# Patient Record
Sex: FEMALE | Race: WHITE | URBAN - METROPOLITAN AREA
[De-identification: names, ages, dates, MRNs, and addresses within clinical notes are randomized per-mention and may not be internally consistent; named-entity substitution may affect disease eponyms.]

---

## 2024-02-19 ENCOUNTER — LAB (OUTPATIENT)
Dept: LAB | Facility: LAB | Age: 29
End: 2024-02-19
Payer: COMMERCIAL

## 2024-02-19 ENCOUNTER — OFFICE VISIT (OUTPATIENT)
Dept: PRIMARY CARE | Facility: CLINIC | Age: 29
End: 2024-02-19
Payer: COMMERCIAL

## 2024-02-19 VITALS
BODY MASS INDEX: 24.27 KG/M2 | WEIGHT: 151 LBS | HEART RATE: 66 BPM | OXYGEN SATURATION: 98 % | DIASTOLIC BLOOD PRESSURE: 83 MMHG | SYSTOLIC BLOOD PRESSURE: 122 MMHG | HEIGHT: 66 IN | RESPIRATION RATE: 20 BRPM | TEMPERATURE: 97.9 F

## 2024-02-19 DIAGNOSIS — Z00.00 HEALTHCARE MAINTENANCE: ICD-10-CM

## 2024-02-19 DIAGNOSIS — M54.2 NECK PAIN: Primary | ICD-10-CM

## 2024-02-19 LAB
25(OH)D3 SERPL-MCNC: 29 NG/ML (ref 30–100)
ALBUMIN SERPL BCP-MCNC: 4.7 G/DL (ref 3.4–5)
ALP SERPL-CCNC: 55 U/L (ref 33–110)
ALT SERPL W P-5'-P-CCNC: 13 U/L (ref 7–45)
ANION GAP SERPL CALC-SCNC: 14 MMOL/L (ref 10–20)
AST SERPL W P-5'-P-CCNC: 16 U/L (ref 9–39)
BASOPHILS # BLD AUTO: 0.03 X10*3/UL (ref 0–0.1)
BASOPHILS NFR BLD AUTO: 0.4 %
BILIRUB SERPL-MCNC: 1 MG/DL (ref 0–1.2)
BUN SERPL-MCNC: 12 MG/DL (ref 6–23)
CALCIUM SERPL-MCNC: 9.8 MG/DL (ref 8.6–10.3)
CHLORIDE SERPL-SCNC: 102 MMOL/L (ref 98–107)
CHOLEST SERPL-MCNC: 177 MG/DL (ref 0–199)
CHOLESTEROL/HDL RATIO: 2.9
CO2 SERPL-SCNC: 27 MMOL/L (ref 21–32)
CREAT SERPL-MCNC: 0.79 MG/DL (ref 0.5–1.05)
EGFRCR SERPLBLD CKD-EPI 2021: >90 ML/MIN/1.73M*2
EOSINOPHIL # BLD AUTO: 0.09 X10*3/UL (ref 0–0.7)
EOSINOPHIL NFR BLD AUTO: 1.3 %
ERYTHROCYTE [DISTWIDTH] IN BLOOD BY AUTOMATED COUNT: 11.8 % (ref 11.5–14.5)
GLUCOSE SERPL-MCNC: 82 MG/DL (ref 74–99)
HCT VFR BLD AUTO: 43.4 % (ref 36–46)
HCV AB SER QL: NONREACTIVE
HDLC SERPL-MCNC: 60.3 MG/DL
HGB BLD-MCNC: 15.1 G/DL (ref 12–16)
HIV 1+2 AB+HIV1 P24 AG SERPL QL IA: NONREACTIVE
IMM GRANULOCYTES # BLD AUTO: 0.01 X10*3/UL (ref 0–0.7)
IMM GRANULOCYTES NFR BLD AUTO: 0.1 % (ref 0–0.9)
LDLC SERPL CALC-MCNC: 102 MG/DL
LYMPHOCYTES # BLD AUTO: 3.02 X10*3/UL (ref 1.2–4.8)
LYMPHOCYTES NFR BLD AUTO: 45.3 %
MCH RBC QN AUTO: 30.5 PG (ref 26–34)
MCHC RBC AUTO-ENTMCNC: 34.8 G/DL (ref 32–36)
MCV RBC AUTO: 88 FL (ref 80–100)
MONOCYTES # BLD AUTO: 0.49 X10*3/UL (ref 0.1–1)
MONOCYTES NFR BLD AUTO: 7.3 %
NEUTROPHILS # BLD AUTO: 3.03 X10*3/UL (ref 1.2–7.7)
NEUTROPHILS NFR BLD AUTO: 45.6 %
NON HDL CHOLESTEROL: 117 MG/DL (ref 0–149)
NRBC BLD-RTO: 0 /100 WBCS (ref 0–0)
PLATELET # BLD AUTO: 260 X10*3/UL (ref 150–450)
POTASSIUM SERPL-SCNC: 4.8 MMOL/L (ref 3.5–5.3)
PROT SERPL-MCNC: 7.4 G/DL (ref 6.4–8.2)
RBC # BLD AUTO: 4.95 X10*6/UL (ref 4–5.2)
SODIUM SERPL-SCNC: 138 MMOL/L (ref 136–145)
TRIGL SERPL-MCNC: 74 MG/DL (ref 0–149)
TSH SERPL-ACNC: 1.26 MIU/L (ref 0.44–3.98)
VLDL: 15 MG/DL (ref 0–40)
WBC # BLD AUTO: 6.7 X10*3/UL (ref 4.4–11.3)

## 2024-02-19 PROCEDURE — 1036F TOBACCO NON-USER: CPT

## 2024-02-19 PROCEDURE — 85025 COMPLETE CBC W/AUTO DIFF WBC: CPT

## 2024-02-19 PROCEDURE — 80053 COMPREHEN METABOLIC PANEL: CPT

## 2024-02-19 PROCEDURE — 87389 HIV-1 AG W/HIV-1&-2 AB AG IA: CPT

## 2024-02-19 PROCEDURE — 82306 VITAMIN D 25 HYDROXY: CPT

## 2024-02-19 PROCEDURE — 36415 COLL VENOUS BLD VENIPUNCTURE: CPT

## 2024-02-19 PROCEDURE — 80061 LIPID PANEL: CPT

## 2024-02-19 PROCEDURE — 84443 ASSAY THYROID STIM HORMONE: CPT

## 2024-02-19 PROCEDURE — 86803 HEPATITIS C AB TEST: CPT

## 2024-02-19 PROCEDURE — 99203 OFFICE O/P NEW LOW 30 MIN: CPT

## 2024-02-19 NOTE — ASSESSMENT & PLAN NOTE
Patient's neck and throat are normal on exam with no lymphadenopathy.  She may have had some post viral cervical lymphadenopathy following her illness in January.  The discomfort is already getting better.  We will obtain full annual blood work including a CBC with differential and TSH with reflex to free T4 if abnormal.  If there are any abnormalities on blood work that could be because of her neck pain or if it is not getting better over the course the next few weeks, then we will obtain an ultrasound of the neck.  Follow-up as needed.

## 2024-02-19 NOTE — PROGRESS NOTES
I reviewed the resident/fellow's documentation and discussed the patient with the resident. I agree with the resident medical decision making as documented in the note.   Patient's physical exam was normal per resident.  No night sweats, no bruising, no no fatigue, will get blood work.  If her symptoms which have been improving are still present in 2 weeks we will get an ultrasound of her neck possible CT scan.  Patient has no trouble swallowing talking.  If she starts having any, any increase symptoms any swelling any fever chills any trouble with her neck breathing go to the ER  Follow-up in 2 to 4 weeks  Agree with assessment and plan  Albino Whittington, DO

## 2024-02-19 NOTE — PROGRESS NOTES
"Subjective   Nan Meyer is a 29 y.o. female who presents for Neck Pain (Neck pain/ swollen areas to neck since January.).  For the last few weeks the patient has felt some mild discomfort on the right anterior side of her neck.  There was no injuries.  She was sick in January and had COVID positive sick contacts so she believes it may have been COVID.  She is no longer feeling sick and is not having any fevers, chills, sore throat, ear pain.  She thought that she might have felt a slightly swollen area on her anterior right neck but she cannot find it today.  Of note, patient's mother has AML so patient is concerned about the possibility of this in herself.    Objective   /83 (BP Location: Right arm, Patient Position: Sitting)   Pulse 66   Temp 36.6 °C (97.9 °F)   Resp 20   Ht 1.676 m (5' 6\")   Wt 68.5 kg (151 lb)   SpO2 98%   BMI 24.37 kg/m²    PHYSICAL EXAM  Gen: Well appearing, in NAD  Eyes: EOMI  HEENT: MMM. TMs normal. Throat normal.  There is no lymphadenopathy felt or seen in the neck.  There is no tenderness to palpation of the neck or thyroid.  Thyroid feels normal.  Heart: RRR, no murmurs  Lungs: No increased work of breathing, CTAB, on RA  Extremities: WWP, cap refill <2sec, no pitting edema in LE b/l  Neuro: Alert, symmetrical facies, moves all extremities equally  Psych: Appropriate mood and affect    Assessment/Plan     Problem List Items Addressed This Visit       Neck pain - Primary     Patient's neck and throat are normal on exam with no lymphadenopathy.  She may have had some post viral cervical lymphadenopathy following her illness in January.  The discomfort is already getting better.  We will obtain full annual blood work including a CBC with differential and TSH with reflex to free T4 if abnormal.  If there are any abnormalities on blood work that could be because of her neck pain or if it is not getting better over the course the next few weeks, then we will obtain an ultrasound " of the neck.  Follow-up as needed.         Healthcare maintenance    Relevant Orders    Comprehensive Metabolic Panel    Lipid Panel    TSH with reflex to Free T4 if abnormal    Vitamin D 25-Hydroxy,Total (for eval of Vitamin D levels)    CBC and Auto Differential    Hepatitis C Antibody    HIV 1/2 Antigen/Antibody Screen with Reflex to Confirmation      Etta Olea DO  Family Medicine Resident, PGY-3  [facility]  [address] [place] [address] [phone]

## 2024-02-20 NOTE — RESULT ENCOUNTER NOTE
LDL cholesterol slightly elevated at 102.  Vitamin D level is just slightly low at 29.  She can start taking over-the-counter vitamin D 2000 international units daily.  Remainder of blood work is normal.  Patient updated via telephone.

## 2024-03-25 ENCOUNTER — TELEPHONE (OUTPATIENT)
Dept: PRIMARY CARE | Facility: CLINIC | Age: 29
End: 2024-03-25
Payer: COMMERCIAL

## 2024-03-25 DIAGNOSIS — M54.2 NECK PAIN: Primary | ICD-10-CM

## 2024-04-01 ENCOUNTER — HOSPITAL ENCOUNTER (OUTPATIENT)
Dept: RADIOLOGY | Facility: CLINIC | Age: 29
Discharge: HOME | End: 2024-04-01
Payer: COMMERCIAL

## 2024-04-01 DIAGNOSIS — M54.2 NECK PAIN: ICD-10-CM

## 2024-04-01 PROCEDURE — 76536 US EXAM OF HEAD AND NECK: CPT | Performed by: RADIOLOGY

## 2024-04-01 PROCEDURE — 76536 US EXAM OF HEAD AND NECK: CPT

## 2024-04-03 DIAGNOSIS — R22.1 NECK MASS: Primary | ICD-10-CM

## 2024-04-03 NOTE — RESULT ENCOUNTER NOTE
Patient ultrasound so she may have a brachial cleft cyst which is a developmental cyst.  However we need to confirm this they recommend getting a CT scan.  I placed that order

## 2024-04-11 ENCOUNTER — TELEPHONE (OUTPATIENT)
Dept: PRIMARY CARE | Facility: CLINIC | Age: 29
End: 2024-04-11
Payer: COMMERCIAL

## 2024-04-11 DIAGNOSIS — M54.2 NECK PAIN: Primary | ICD-10-CM

## 2024-04-11 NOTE — TELEPHONE ENCOUNTER
Rhoda Casey from   pr-cert dept called the insurance company is denying the C-T of neck soft tissue with out contrast. Rhoda said they would cover it if it is with contrast the apt is 4-17-24 can this be changed please advise.

## 2024-04-17 ENCOUNTER — HOSPITAL ENCOUNTER (OUTPATIENT)
Dept: RADIOLOGY | Facility: CLINIC | Age: 29
Discharge: HOME | End: 2024-04-17
Payer: COMMERCIAL

## 2024-04-17 ENCOUNTER — APPOINTMENT (OUTPATIENT)
Dept: RADIOLOGY | Facility: CLINIC | Age: 29
End: 2024-04-17
Payer: COMMERCIAL

## 2024-04-17 DIAGNOSIS — M54.2 NECK PAIN: ICD-10-CM

## 2024-04-17 PROCEDURE — 70491 CT SOFT TISSUE NECK W/DYE: CPT | Performed by: RADIOLOGY

## 2024-04-17 PROCEDURE — 2550000001 HC RX 255 CONTRASTS: Performed by: FAMILY MEDICINE

## 2024-04-17 PROCEDURE — 70491 CT SOFT TISSUE NECK W/DYE: CPT

## 2024-04-17 RX ADMIN — IOHEXOL 70 ML: 350 INJECTION, SOLUTION INTRAVENOUS at 15:43

## 2024-04-18 DIAGNOSIS — Q89.2 THYROGLOSSAL DUCT CYST: Primary | ICD-10-CM

## 2024-04-18 NOTE — RESULT ENCOUNTER NOTE
Nan with CT of your neck shows what appears to be a thyroglossal duct cyst, the center almost always benign in an adult however they will remove them and usually they do not return.  I placed a referral to see ENT.  If you do not hear from them to schedule within the next few days please let me know

## 2024-12-02 ENCOUNTER — APPOINTMENT (OUTPATIENT)
Dept: OTOLARYNGOLOGY | Facility: CLINIC | Age: 29
End: 2024-12-02
Payer: COMMERCIAL

## 2024-12-02 VITALS
HEIGHT: 66 IN | BODY MASS INDEX: 24.75 KG/M2 | SYSTOLIC BLOOD PRESSURE: 126 MMHG | DIASTOLIC BLOOD PRESSURE: 76 MMHG | WEIGHT: 154 LBS

## 2024-12-02 DIAGNOSIS — Q89.2 THYROGLOSSAL DUCT CYST: Primary | ICD-10-CM

## 2024-12-02 PROCEDURE — 1036F TOBACCO NON-USER: CPT | Performed by: STUDENT IN AN ORGANIZED HEALTH CARE EDUCATION/TRAINING PROGRAM

## 2024-12-02 PROCEDURE — 99204 OFFICE O/P NEW MOD 45 MIN: CPT | Performed by: STUDENT IN AN ORGANIZED HEALTH CARE EDUCATION/TRAINING PROGRAM

## 2024-12-02 PROCEDURE — 3008F BODY MASS INDEX DOCD: CPT | Performed by: STUDENT IN AN ORGANIZED HEALTH CARE EDUCATION/TRAINING PROGRAM

## 2024-12-02 NOTE — PROGRESS NOTES
ENT Outpatient Consultation    Chief Complaint: neck mass    History Of Present Illness  Nan Meyer is a 29 y.o. female presents for evaluation of a neck mass. In February she woke up one morning with neck stiffness and felt like she was getting sick. The right side of her neck was particularly stiff. She ultimately had an ultrasound and then CT of the neck which showed a paramedian neck mass most consistent with a TGDC. No infections requiring antibiotics, no overlying skin changes. No prior surgery. Mom has AML and she was concerned that it could be a cancer. No personal or family history of thyroid disease. She is otherwise healthy.     Past Medical History  She has no past medical history on file.    Surgical History  She has no past surgical history on file.     Social History  She reports that she has never smoked. She has never used smokeless tobacco. She reports current alcohol use. She reports that she does not use drugs.    Family History  No family history on file.     Allergies  Patient has no known allergies.     Physical Exam:  CONSTITUTIONAL:  No acute distress  VOICE:  No hoarseness or other abnormality  RESPIRATION:  Breathing comfortably, no stridor  EYES:  EOM intact, sclera normal  NEURO:  Alert and oriented times 3  HEAD AND FACE:  Symmetric facial features, no masses or lesions  EARS:  Normal external ears, external auditory canals, and TMs to otoscopy, normal hearing to whispered voice.  NOSE:  External nose midline, anterior rhinoscopy is normal with limited visualization to the anterior aspect of the interior turbinates, no bleeding or drainage, no lesions  ORAL CAVITY/OROPHARYNX/LIPS:  Normal mucous membranes, normal floor of mouth/tongue/OP, no masses or lesions  PHARYNGEAL WALLS:  No masses or lesions  NECK/LYMPH:  mobile small scattered cervical LAD, no thyroid masses, trachea midline. No palpable mass corresponding to area of concern, however there is palpable fullness along the right  "paramedial thyrohyoid space  SKIN:  Neck skin is without scar or injury  PSYCH:  Alert and oriented with appropriate mood and affect     Last Recorded Vitals  Blood pressure 126/76, height 1.676 m (5' 6\"), weight 69.9 kg (154 lb).    Relevant Results        My independent interpretation:  Cystic 9 x 7 mm mass overlying thyrohyoid space just to the right of midline. No surrounding inflammatory changes. Thyroid gland is present and normal in appearance.    Assessment and Plan  29 y.o. 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 Thyroid gland is normal in appearance. Today we discussed that surgical excision would be recommended to confirm diagnosis as well as to reduce the risk of recurrent infections in the future. Imaging was reviewed with the patient. We reviewed r/b/a as well as the proposed surgery. She would like to wait until next year to have surgery as she has multiple events and trips planned for the first half of 2025. Discussed reasons to have surgery sooner, e.g. recurrent infections or substantial growth. She will follow up in about 6 months for surgical planning or sooner if symptoms change.    Problem List Items Addressed This Visit          ENT    Thyroglossal duct cyst - Primary       Jeanne Mcfarland MD  "

## 2025-01-20 DIAGNOSIS — L72.9 CYST OF SKIN: Primary | ICD-10-CM

## 2025-02-25 ENCOUNTER — APPOINTMENT (OUTPATIENT)
Dept: PRIMARY CARE | Facility: CLINIC | Age: 30
End: 2025-02-25
Payer: COMMERCIAL

## 2025-02-25 VITALS
SYSTOLIC BLOOD PRESSURE: 110 MMHG | BODY MASS INDEX: 24.91 KG/M2 | DIASTOLIC BLOOD PRESSURE: 68 MMHG | WEIGHT: 155 LBS | HEART RATE: 70 BPM | HEIGHT: 66 IN | TEMPERATURE: 97.7 F

## 2025-02-25 DIAGNOSIS — F41.9 ANXIETY: ICD-10-CM

## 2025-02-25 DIAGNOSIS — Z00.00 ANNUAL PHYSICAL EXAM: Primary | ICD-10-CM

## 2025-02-25 DIAGNOSIS — Z12.4 CERVICAL CANCER SCREENING: ICD-10-CM

## 2025-02-25 DIAGNOSIS — Q89.2 THYROGLOSSAL DUCT CYST: ICD-10-CM

## 2025-02-25 DIAGNOSIS — Z00.00 HEALTH CARE MAINTENANCE: ICD-10-CM

## 2025-02-25 PROBLEM — M54.2 NECK PAIN: Status: RESOLVED | Noted: 2024-02-19 | Resolved: 2025-02-25

## 2025-02-25 PROCEDURE — 99395 PREV VISIT EST AGE 18-39: CPT

## 2025-02-25 PROCEDURE — 1036F TOBACCO NON-USER: CPT

## 2025-02-25 PROCEDURE — 3008F BODY MASS INDEX DOCD: CPT

## 2025-02-25 ASSESSMENT — PATIENT HEALTH QUESTIONNAIRE - PHQ9
SUM OF ALL RESPONSES TO PHQ9 QUESTIONS 1 AND 2: 0
1. LITTLE INTEREST OR PLEASURE IN DOING THINGS: NOT AT ALL
2. FEELING DOWN, DEPRESSED OR HOPELESS: NOT AT ALL

## 2025-02-25 NOTE — PATIENT INSTRUCTIONS
Look into last tdap aka tetanus aka boostrix shot   See optometrist    Treatment for anxiety & depression without prescription medication:  - Speak with a talk therapist/counselor to work through your emotions and come up with healthy coping mechanisms. Alternatively you could try journaling to help work through your worries and your gratitude. Reach out to loved ones for social support.  - Supplements: magnesium glycinate (200-400 mg/day), ashwagandha (300-600 mg/day), L-theanine (100-200 mg/day), omega-3 fatty acids (1,000-2,000 mg/day of EPA/DHA), 5-HTP ( mg/day), NELLIE-e (200-1,200 mg/day), ALEJANDRO (100-500 mg/day), valerian root (300-600 mg/day), probiotics with lactobacillus or bifidobacterium, vitamin D (2,000-5,000 iu/day), vitamin B12 (1,000-2,000 mcg/day)  - Exercise releases endorphins and lowers cortisol levels. Don’t love to exercise? Try going for a walk and listen to music, podcasts, audiobooks, etc.  - Prioritize 7-9 hours of sleep each night. Work on good sleep hygiene. Avoid screens directly before bed (blue light is overstimulating). Stop eating/drinking 1+ hour before bed. Read or take a hot bath before bed to wind down.  - Practice mindfulness or try guided meditations (YouTube, Podcasts, Calm juan, Headspace juan)  - Engage in grounding techniques to manage panic (“5-4-3-2-1 method,” box breathing, hold an ice cube, splash cold water on your face, suck on a warhead or other sour candy)  - Yoga, which combines movement, breath, and meditation (in person classes, YouTube tutorials)  - Stabilize your blood sugar to help regulate your mood by eating three meals a day and focusing on protein, healthy fats, and fiber instead of simple sugars which spike and crash the blood sugar  - Limit caffeine, alcohol, and nicotine. All of these can exacerbate anxiety and depression symptoms. Opt for decaf coffee or herbal tea (chamomile, peppermint, valerian root, lemon balm, lavender) when possible.  - Spend  time outdoors  - Get a massage or acupuncture   - Use aromatherapy with essential oils in a diffuser (try lavender, chamomile, michaela, bergamot, frankincense, patchouli, ylang ylang)  - Consider taking a break from social media and/or news outlets, or set time limits for your devices  - Need more help? See your doctor to discuss prescription medication options  - In crisis? Call or text the National Suicide Prevention Lifeline at 988, call emergency services at 911, or present to the emergency department

## 2025-02-25 NOTE — PROGRESS NOTES
"Subjective   Nan Meyer is a 30 y.o. female who is here for Annual Exam.    ANNUAL PHYSICAL EXAM    Specialists that pt follows with: dermatologist     Preventative:  - Immunizations: UTD on covid x3, Tdap. Doesn't get flu shots  - Pap smears: Overdue  - Colorectal cancer screening: Grandma & great aunt had colon cancer. Doesn't need early screening   - Chlamydia/Gonorrhea testing: Not interested   - 1 time Hep C testing: done  - 1 time HIV testing: done  - Dental care: UTD  - Vision care: Needs    Lifestyle:  - Occupation: Works in [employer] for \"[employer]".  She works a hybrid schedule, most of the time remotely from home, and a couple days a week she goes in person to the office   - Diet: Well balanced  - Exercise: Regularly  - Alcohol/tobacco/drugs: Occasional social alcohol use, no tobacco/nicotine/drugs  - Menstrual periods: Regular  - Contraception: Cycle tracking, Withdrawal method. Was on lo-loestrin, gave mood swings.  Not interested in any hormonal contraception.  Is afraid of heavier periods on ParaGard copper IUD.  - Mood: Anxiety. Doesn't want meds.  Has never spoken with a counselor.  Does not feel that she needs to speak to professional at this time.    Active Problem List      Comprehensive Medical/Surgical/Social/Family History  History reviewed. No pertinent past medical history.  History reviewed. No pertinent surgical history.  Social History     Social History Narrative    Not on file       Allergies and Medications  Patient has no known allergies.  No current outpatient medications on file prior to visit.     No current facility-administered medications on file prior to visit.       Objective   /68   Pulse 70   Temp 36.5 °C (97.7 °F)   Ht 1.676 m (5' 6\")   Wt 70.3 kg (155 lb)   BMI 25.02 kg/m²    PHYSICAL EXAM  Gen: Well appearing, in NAD  Eyes: EOMI  HEENT: MMM. TMs normal. Throat normal.  Heart: RRR, no murmurs  Lungs: No increased work of breathing, CTAB, on RA  GI: Soft, NTND, " no guarding or rebound  Extremities: WWP, cap refill <2sec, no pitting edema in LE b/l  Neuro: Alert, symmetrical facies, moves all extremities equally  Skin: No rashes or lesions  Psych: Appropriate mood and affect    Assessment/Plan   - Reviewed Social Determinants of health with patient. Discussed healthy lifestyle, including 150 minutes of physical activity per week.  - Ordered/Reviewed baseline labwork   - Immunizations up to date  - Follow up in 1 year for next annual physical or sooner for acute concerns    Problem List Items Addressed This Visit       Thyroglossal duct cyst    Overview     Follows with ENT.  May end up needing it surgically removed         Anxiety    Overview     Well-controlled with just lifestyle modifications at this time          Other Visit Diagnoses       Annual physical exam    -  Primary    Health care maintenance        Relevant Orders    CBC    Comprehensive Metabolic Panel    Lipid Panel    TSH with reflex to Free T4 if abnormal    Vitamin D 25-Hydroxy,Total (for eval of Vitamin D levels)    Vitamin B12    Hemoglobin A1C    Cervical cancer screening        Relevant Orders    THINPREP PAP TEST          Etta Olea D.O.  Family Medicine Physician  [facility]  [address] [place] [address] [phone]    This note has been transcribed using Dragon voice recognition system and there is a possibility of unintentional typing misprints.

## 2025-03-17 LAB
CYTOLOGY CMNT CVX/VAG CYTO-IMP: NORMAL
LAB AP HPV GENOTYPE QUESTION: YES
LAB AP HPV HR: NORMAL
LABORATORY COMMENT REPORT: NORMAL
PATH REPORT.TOTAL CANCER: NORMAL

## 2025-06-09 ENCOUNTER — APPOINTMENT (OUTPATIENT)
Facility: CLINIC | Age: 30
End: 2025-06-09
Payer: COMMERCIAL

## 2025-07-24 LAB
25(OH)D3+25(OH)D2 SERPL-MCNC: 47 NG/ML (ref 30–100)
ALBUMIN SERPL-MCNC: 4.8 G/DL (ref 3.6–5.1)
ALP SERPL-CCNC: 56 U/L (ref 31–125)
ALT SERPL-CCNC: 13 U/L (ref 6–29)
ANION GAP SERPL CALCULATED.4IONS-SCNC: 6 MMOL/L (CALC) (ref 7–17)
AST SERPL-CCNC: 15 U/L (ref 10–30)
BILIRUB SERPL-MCNC: 1.2 MG/DL (ref 0.2–1.2)
BUN SERPL-MCNC: 14 MG/DL (ref 7–25)
CALCIUM SERPL-MCNC: 9.5 MG/DL (ref 8.6–10.2)
CHLORIDE SERPL-SCNC: 105 MMOL/L (ref 98–110)
CHOLEST SERPL-MCNC: 161 MG/DL
CHOLEST/HDLC SERPL: 2.9 (CALC)
CO2 SERPL-SCNC: 26 MMOL/L (ref 20–32)
CREAT SERPL-MCNC: 0.81 MG/DL (ref 0.5–0.96)
EGFRCR SERPLBLD CKD-EPI 2021: 101 ML/MIN/1.73M2
ERYTHROCYTE [DISTWIDTH] IN BLOOD BY AUTOMATED COUNT: 12.3 % (ref 11–15)
EST. AVERAGE GLUCOSE BLD GHB EST-MCNC: 94 MG/DL
EST. AVERAGE GLUCOSE BLD GHB EST-SCNC: 5.2 MMOL/L
GLUCOSE SERPL-MCNC: 84 MG/DL (ref 65–99)
HBA1C MFR BLD: 4.9 %
HCT VFR BLD AUTO: 45.1 % (ref 35–45)
HDLC SERPL-MCNC: 55 MG/DL
HGB BLD-MCNC: 15 G/DL (ref 11.7–15.5)
LDLC SERPL CALC-MCNC: 91 MG/DL (CALC)
MCH RBC QN AUTO: 31.1 PG (ref 27–33)
MCHC RBC AUTO-ENTMCNC: 33.3 G/DL (ref 32–36)
MCV RBC AUTO: 93.4 FL (ref 80–100)
NONHDLC SERPL-MCNC: 106 MG/DL (CALC)
PLATELET # BLD AUTO: 227 THOUSAND/UL (ref 140–400)
PMV BLD REES-ECKER: 10.6 FL (ref 7.5–12.5)
POTASSIUM SERPL-SCNC: 4.7 MMOL/L (ref 3.5–5.3)
PROT SERPL-MCNC: 7.1 G/DL (ref 6.1–8.1)
RBC # BLD AUTO: 4.83 MILLION/UL (ref 3.8–5.1)
SODIUM SERPL-SCNC: 137 MMOL/L (ref 135–146)
TRIGL SERPL-MCNC: 68 MG/DL
TSH SERPL-ACNC: 1.38 MIU/L
VIT B12 SERPL-MCNC: 592 PG/ML (ref 200–1100)
WBC # BLD AUTO: 6.2 THOUSAND/UL (ref 3.8–10.8)